# Patient Record
Sex: MALE | Race: WHITE | NOT HISPANIC OR LATINO | Employment: STUDENT | URBAN - METROPOLITAN AREA
[De-identification: names, ages, dates, MRNs, and addresses within clinical notes are randomized per-mention and may not be internally consistent; named-entity substitution may affect disease eponyms.]

---
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
## 2024-03-15 ENCOUNTER — OFFICE VISIT (OUTPATIENT)
Dept: DERMATOLOGY | Facility: CLINIC | Age: 19
End: 2024-03-15
Payer: COMMERCIAL

## 2024-03-15 DIAGNOSIS — L64.9 ANDROGENIC ALOPECIA: ICD-10-CM

## 2024-03-15 DIAGNOSIS — L70.0 ACNE VULGARIS: Primary | ICD-10-CM

## 2024-03-15 DIAGNOSIS — L21.9 SEBORRHEIC DERMATITIS: ICD-10-CM

## 2024-03-15 PROCEDURE — 99204 OFFICE O/P NEW MOD 45 MIN: CPT | Performed by: STUDENT IN AN ORGANIZED HEALTH CARE EDUCATION/TRAINING PROGRAM

## 2024-03-15 RX ORDER — MINOXIDIL 2.5 MG/1
1.25 TABLET ORAL DAILY
Qty: 45 TABLET | Refills: 3 | Status: SHIPPED | OUTPATIENT
Start: 2024-03-15 | End: 2025-03-15

## 2024-03-15 RX ORDER — BENZOYL PEROXIDE 50 MG/ML
1 LIQUID TOPICAL DAILY
Qty: 236 G | Refills: 11 | Status: SHIPPED | OUTPATIENT
Start: 2024-03-15 | End: 2025-03-15

## 2024-03-15 RX ORDER — KETOCONAZOLE 20 MG/ML
SHAMPOO, SUSPENSION TOPICAL EVERY OTHER DAY
Qty: 120 ML | Refills: 11 | Status: SHIPPED | OUTPATIENT
Start: 2024-03-15

## 2024-03-15 RX ORDER — FINASTERIDE 1 MG/1
1 TABLET, FILM COATED ORAL DAILY
Qty: 90 TABLET | Refills: 3 | Status: SHIPPED | OUTPATIENT
Start: 2024-03-15 | End: 2025-03-15

## 2024-03-15 RX ORDER — CLINDAMYCIN PHOSPHATE 10 UG/ML
LOTION TOPICAL DAILY
Qty: 60 ML | Refills: 11 | Status: SHIPPED | OUTPATIENT
Start: 2024-03-15 | End: 2025-03-15

## 2024-03-15 NOTE — PROGRESS NOTES
Subjective     Faizan Lomeli is a 18 y.o. male who presents for the following: Alopecia (Patient experiencing hair loss for the past two years. Currently oral minoxidil and finasteride. Has also tried topical minoxidil.).     Review of Systems:  No other skin or systemic complaints other than what is documented elsewhere in the note.    The following portions of the chart were reviewed this encounter and updated as appropriate:          Skin Cancer History  No skin cancer on file.      Specialty Problems    None       Objective   Well appearing patient in no apparent distress; mood and affect are within normal limits.    A focused skin examination was performed. All findings within normal limits unless otherwise noted below.    Assessment/Plan   1. Acne vulgaris  Head - Anterior (Face)  Scattered comedones and inflammatory papulopustules.    The chronic and relapsing course of acne was discussed with patient. The patient's condition is currently flaring. Discussed that acne pathogenesis is multifactorial resulting from follicular occlusion from excessive sebum production, bacterial colonization, and rupture resulting in acute and chronic inflammation.     Start BPO 5% cleanser daily to affected area. Discussed risk of skin irritation and bleaching of towels/clothing.    Start clindamycin 1% lotion qam to affected area. Discussed to use with BPO to prevent bacterial resistance.    Start adapalene 0.1% gel before bed. Start 2-3 times per week and slowly increase to nightly. Use with moisturizer. Side effects of topical retinoids reviewed including increased photosensitivity, dryness, irritation and redness.     clindamycin (Cleocin T) 1 % lotion - Head - Anterior (Face)  Apply topically once daily.    benzoyl peroxide 5 % external wash - Head - Anterior (Face)  Apply 1 Application topically once daily.    Related Procedures  Follow Up In Dermatology - Established Patient    2. Seborrheic dermatitis  Scalp  Erythema  with overlying greasy scale.    Discussed the chronic and relapsing nature of the condition.  Discussed that goal is control, not cure, of condition.     Start ketoconazole 2% shampoo daily to affected area. Leave on 5 minutes before rinsing.       ketoconazole (NIZOral) 2 % shampoo - Scalp  Apply topically every other day.    3. Androgenic alopecia  Scalp  Thinning of mid frontal and vertex scalp    Continue propecia 1 mg daily. Refilled today. Reviewed risk of sexual side effects and depression.    Discussed r/b/se of low dose oral minoxidil. This medication is taken daily and can result in decreased shedding and increased growth in some patients. Side effects include decreased BP, lightheadedness, hypertrichosis, swelling, weight gain, and edema around the heart. Discussed warning signs of swelling, chest pain, difficulty breathing. Patient to call office if they experience these symptoms.     Continue 1.25 mg minoxidil daily (1/2 2.5 mg pill)      finasteride (Propecia) 1 mg tablet - Scalp  Take 1 tablet (1 mg) by mouth once daily. Do not crush, chew, or split.    minoxidil (Loniten) 2.5 mg tablet - Scalp  Take 0.5 tablets (1.25 mg) by mouth once daily.

## 2024-09-20 ENCOUNTER — APPOINTMENT (OUTPATIENT)
Dept: DERMATOLOGY | Facility: CLINIC | Age: 19
End: 2024-09-20
Payer: COMMERCIAL

## 2024-09-20 ENCOUNTER — OFFICE VISIT (OUTPATIENT)
Dept: DERMATOLOGY | Facility: CLINIC | Age: 19
End: 2024-09-20
Payer: COMMERCIAL

## 2024-09-20 DIAGNOSIS — L21.9 SEBORRHEIC DERMATITIS: ICD-10-CM

## 2024-09-20 DIAGNOSIS — L70.0 ACNE VULGARIS: Primary | ICD-10-CM

## 2024-09-20 DIAGNOSIS — L64.9 ANDROGENIC ALOPECIA: ICD-10-CM

## 2024-09-20 PROCEDURE — 99214 OFFICE O/P EST MOD 30 MIN: CPT | Performed by: STUDENT IN AN ORGANIZED HEALTH CARE EDUCATION/TRAINING PROGRAM

## 2024-09-20 RX ORDER — CLINDAMYCIN PHOSPHATE 10 UG/ML
LOTION TOPICAL DAILY
Qty: 60 ML | Refills: 11 | Status: SHIPPED | OUTPATIENT
Start: 2024-09-20 | End: 2025-09-20

## 2024-09-20 RX ORDER — FINASTERIDE 1 MG/1
1 TABLET, FILM COATED ORAL DAILY
Qty: 90 TABLET | Refills: 3 | Status: SHIPPED | OUTPATIENT
Start: 2024-09-20 | End: 2025-09-20

## 2024-09-20 RX ORDER — KETOCONAZOLE 20 MG/ML
SHAMPOO, SUSPENSION TOPICAL EVERY OTHER DAY
Qty: 120 ML | Refills: 11 | Status: SHIPPED | OUTPATIENT
Start: 2024-09-20

## 2024-09-20 RX ORDER — BENZOYL PEROXIDE 50 MG/ML
1 LIQUID TOPICAL DAILY
Qty: 236 G | Refills: 11 | Status: SHIPPED | OUTPATIENT
Start: 2024-09-20 | End: 2025-09-20

## 2024-09-20 RX ORDER — TRETINOIN 0.25 MG/G
CREAM TOPICAL NIGHTLY
Qty: 45 G | Refills: 11 | Status: SHIPPED | OUTPATIENT
Start: 2024-09-20 | End: 2025-09-20

## 2024-09-20 RX ORDER — DOXYCYCLINE 100 MG/1
100 CAPSULE ORAL 2 TIMES DAILY
Qty: 180 CAPSULE | Refills: 0 | Status: SHIPPED | OUTPATIENT
Start: 2024-09-20

## 2024-09-20 RX ORDER — MINOXIDIL 2.5 MG/1
1.25 TABLET ORAL DAILY
Qty: 45 TABLET | Refills: 3 | Status: SHIPPED | OUTPATIENT
Start: 2024-09-20 | End: 2025-09-20

## 2024-09-20 NOTE — PROGRESS NOTES
Subjective     Faizan Lomeli is a 18 y.o. male who presents for the following: Acne (Patient is using BPO, Clindamycin, and Adapalene gel. He states that his skin had improved but since returning to school, stress has caused some flare ups. ) and Alopecia (Patient has seen a little regrowth on sides of scalp. He has been using oral minoxidil and finasteride. ).     Review of Systems:  No other skin or systemic complaints other than what is documented elsewhere in the note.    The following portions of the chart were reviewed this encounter and updated as appropriate:          Skin Cancer History  No skin cancer on file.      Specialty Problems    None       Objective   Well appearing patient in no apparent distress; mood and affect are within normal limits.    A focused skin examination was performed. All findings within normal limits unless otherwise noted below.    Assessment/Plan   1. Acne vulgaris  Head - Anterior (Face)  Scattered comedones and inflammatory papulopustules.    The chronic and relapsing course of acne was discussed with patient. The patient's condition is currently flaring. Discussed that acne pathogenesis is multifactorial resulting from follicular occlusion from excessive sebum production, bacterial colonization, and rupture resulting in acute and chronic inflammation.     Continue BPO 5% cleanser daily to affected area. Discussed risk of skin irritation and bleaching of towels/clothing.    Continue clindamycin 1% lotion qam to affected area. Discussed to use with BPO to prevent bacterial resistance.    START tretinoin 0.25%. Apply a pea sized amount every other night. Can titrate up to every night if not too dry. Rx sent. Side effects of topical retinoids reviewed including increased photosensitivity, dryness, irritation and redness.     Since pt is flaring, START doxycycline 100 mg bid x3 months. Reviewed side effects including pill esophagitis, heartburn, and photosensitivity. Advised to be  upright for 30 min after taking the pill, and take with a full glass of water. Advised to avoid dairy products    tretinoin (Retin-A) 0.025 % cream - Head - Anterior (Face)  Apply topically once daily at bedtime. Apply a pea size amount every other day. If you are not too dry for 1-2 weeks, can increase to every night    doxycycline (Monodox) 100 mg capsule - Head - Anterior (Face)  Take 1 capsule (100 mg) by mouth 2 times a day. Take with at least 8 ounces (large glass) of water, do not lie down for 30 minutes after    Related Procedures  Follow Up In Dermatology - Established Patient  Follow Up In Dermatology - Established Patient    Related Medications  clindamycin (Cleocin T) 1 % lotion  Apply topically once daily.    benzoyl peroxide 5 % external wash  Apply 1 Application topically once daily.    2. Androgenic alopecia  Scalp  Thinning of mid frontal and vertex scalp    Continue propecia 1 mg daily. Refilled today. Reviewed risk of sexual side effects and depression.    Discussed r/b/se of low dose oral minoxidil. This medication is taken daily and can result in decreased shedding and increased growth in some patients. Side effects include decreased BP, lightheadedness, hypertrichosis, swelling, weight gain, and edema around the heart. Discussed warning signs of swelling, chest pain, difficulty breathing. Patient to call office if they experience these symptoms.     Continue 1.25 mg minoxidil daily (1/2 2.5 mg pill)    Discussed do not expect to see improvement until 6 months from starting the medication      Related Medications  finasteride (Propecia) 1 mg tablet  Take 1 tablet (1 mg) by mouth once daily. Do not crush, chew, or split.    minoxidil (Loniten) 2.5 mg tablet  Take 0.5 tablets (1.25 mg) by mouth once daily.    3. Seborrheic dermatitis  Scalp  Erythema with overlying greasy scale.    Discussed the chronic and relapsing nature of the condition.  Discussed that goal is control, not cure, of condition.      Continue ketoconazole 2% shampoo daily to affected area. Leave on 5 minutes before rinsing.       Related Medications  ketoconazole (NIZOral) 2 % shampoo  Apply topically every other day.      RTC 6 months    Patient seen and discussed with Dr. Baker,   Francine Malcolm MD MPH  PGY-2, Department of Dermatology    I saw and evaluated the patient. I personally obtained the key and critical portions of the history and physical exam or was physically present for key and critical portions performed by the resident. I reviewed the resident's documentation and discussed the patient with the resident. I agree with the resident's medical decision making as documented in the note.    Ana Baker MD

## 2024-09-23 ENCOUNTER — TELEPHONE (OUTPATIENT)
Dept: DERMATOLOGY | Facility: CLINIC | Age: 19
End: 2024-09-23
Payer: COMMERCIAL

## 2024-09-23 NOTE — TELEPHONE ENCOUNTER
Left a message for patient's mother, Marilin, and informed her that Dr. Baker is out of the office this week; however, confirmed that he will absolutely be checking his messages and will be happy to send Faizan's prescriptions to the [facility] pharmacy rather than [facility]. Left our contact number should she have any further questions or concerns.

## 2024-10-01 DIAGNOSIS — L70.0 ACNE VULGARIS: ICD-10-CM

## 2024-10-01 DIAGNOSIS — L64.9 ANDROGENIC ALOPECIA: ICD-10-CM

## 2024-10-01 DIAGNOSIS — L21.9 SEBORRHEIC DERMATITIS: ICD-10-CM

## 2024-10-01 RX ORDER — TRETINOIN 0.25 MG/G
CREAM TOPICAL NIGHTLY
Qty: 45 G | Refills: 11 | Status: SHIPPED | OUTPATIENT
Start: 2024-10-01 | End: 2025-10-01

## 2024-10-01 RX ORDER — FINASTERIDE 1 MG/1
1 TABLET, FILM COATED ORAL DAILY
Qty: 90 TABLET | Refills: 3 | Status: SHIPPED | OUTPATIENT
Start: 2024-10-01 | End: 2025-10-01

## 2024-10-01 RX ORDER — KETOCONAZOLE 20 MG/ML
SHAMPOO, SUSPENSION TOPICAL EVERY OTHER DAY
Qty: 120 ML | Refills: 11 | Status: SHIPPED | OUTPATIENT
Start: 2024-10-01

## 2024-10-01 RX ORDER — BENZOYL PEROXIDE 50 MG/ML
1 LIQUID TOPICAL DAILY
Qty: 236 G | Refills: 11 | Status: SHIPPED | OUTPATIENT
Start: 2024-10-01 | End: 2025-10-01

## 2024-10-01 RX ORDER — MINOXIDIL 2.5 MG/1
1.25 TABLET ORAL DAILY
Qty: 45 TABLET | Refills: 3 | Status: SHIPPED | OUTPATIENT
Start: 2024-10-01 | End: 2025-10-01

## 2024-10-01 RX ORDER — CLINDAMYCIN PHOSPHATE 10 UG/ML
LOTION TOPICAL DAILY
Qty: 60 ML | Refills: 11 | Status: SHIPPED | OUTPATIENT
Start: 2024-10-01 | End: 2025-10-01

## 2025-03-04 ENCOUNTER — APPOINTMENT (OUTPATIENT)
Dept: DERMATOLOGY | Facility: CLINIC | Age: 20
End: 2025-03-04
Payer: COMMERCIAL

## 2025-03-04 DIAGNOSIS — L70.0 ACNE VULGARIS: ICD-10-CM

## 2025-03-04 DIAGNOSIS — L21.9 SEBORRHEIC DERMATITIS: Primary | ICD-10-CM

## 2025-03-04 DIAGNOSIS — L64.9 ANDROGENIC ALOPECIA: ICD-10-CM

## 2025-03-04 PROCEDURE — 99214 OFFICE O/P EST MOD 30 MIN: CPT | Performed by: STUDENT IN AN ORGANIZED HEALTH CARE EDUCATION/TRAINING PROGRAM

## 2025-03-04 RX ORDER — CLINDAMYCIN PHOSPHATE 10 UG/ML
LOTION TOPICAL DAILY
Qty: 60 ML | Refills: 11 | Status: SHIPPED | OUTPATIENT
Start: 2025-03-04 | End: 2026-03-04

## 2025-03-04 RX ORDER — KETOCONAZOLE 20 MG/ML
SHAMPOO, SUSPENSION TOPICAL EVERY OTHER DAY
Qty: 120 ML | Refills: 11 | Status: SHIPPED | OUTPATIENT
Start: 2025-03-04

## 2025-03-04 RX ORDER — MINOXIDIL 2.5 MG/1
2.5 TABLET ORAL DAILY
Qty: 90 TABLET | Refills: 3 | Status: SHIPPED | OUTPATIENT
Start: 2025-03-04 | End: 2026-03-04

## 2025-03-04 RX ORDER — FINASTERIDE 1 MG/1
1 TABLET, FILM COATED ORAL DAILY
Qty: 90 TABLET | Refills: 3 | Status: SHIPPED | OUTPATIENT
Start: 2025-03-04 | End: 2026-03-04

## 2025-03-04 RX ORDER — BENZOYL PEROXIDE 50 MG/ML
1 LIQUID TOPICAL DAILY
Qty: 236 G | Refills: 11 | Status: SHIPPED | OUTPATIENT
Start: 2025-03-04 | End: 2026-03-04

## 2025-03-04 RX ORDER — TRETINOIN 0.5 MG/G
CREAM TOPICAL NIGHTLY
Qty: 45 G | Refills: 11 | Status: SHIPPED | OUTPATIENT
Start: 2025-03-04 | End: 2026-03-04

## 2025-03-05 NOTE — PROGRESS NOTES
Subjective     Faizan Lomeli is a 19 y.o. male who presents for the following: Acne (6 month follow up for acne to face. Using BPO 5% wash, clindamycin 1% lotion, tretinoin 0.025%. Completed a 1 month course of doxycycline 100mg BID. States regimen is working well, had recent flare (exam week). ) and Alopecia (To scalp. Using PO minoxidil 1.25mg daily, and finasteride 1mg daily. Reports minimal change. ).     Review of Systems:  No other skin or systemic complaints other than what is documented elsewhere in the note.    The following portions of the chart were reviewed this encounter and updated as appropriate:          Skin Cancer History  No skin cancer on file.      Specialty Problems    None       Objective   Well appearing patient in no apparent distress; mood and affect are within normal limits.    A focused skin examination was performed. All findings within normal limits unless otherwise noted below.    Assessment/Plan   1. Seborrheic dermatitis  Scalp  Erythema with overlying greasy scale.    Discussed the chronic and relapsing nature of the condition.  Discussed that goal is control, not cure, of condition.     Continue ketoconazole 2% shampoo daily to affected area. Leave on 5 minutes before rinsing.       Related Medications  ketoconazole (NIZOral) 2 % shampoo  Apply topically every other day. Leave on scalp for 5 minutes before rinsing    2. Acne vulgaris  Head - Anterior (Face)  Scattered comedones and inflammatory papulopustules.    The chronic and relapsing course of acne was discussed with patient. The patient's condition is currently flaring. Discussed that acne pathogenesis is multifactorial resulting from follicular occlusion from excessive sebum production, bacterial colonization, and rupture resulting in acute and chronic inflammation.     Continue BPO 5% cleanser daily to affected area. Discussed risk of skin irritation and bleaching of towels/clothing. Start clindamycin lotion.     Continue  clindamycin 1% lotion qam to affected area. Discussed to use with BPO to prevent bacterial resistance.    Increase tretinoin 0.25%-> 0.05% cream . Apply a pea sized amount every other night. Can titrate up to every night if not too dry. Rx sent. Side effects of topical retinoids reviewed including increased photosensitivity, dryness, irritation and redness.         tretinoin (Retin-A) 0.05 % cream - Head - Anterior (Face)  Apply topically once daily at bedtime.    Related Procedures  Follow Up In Dermatology - Established Patient  Follow Up In Dermatology - Established Patient    Related Medications  benzoyl peroxide 5 % external wash  Apply 1 Application topically once daily.    clindamycin (Cleocin T) 1 % lotion  Apply topically once daily.    3. Androgenic alopecia  Scalp  Thinning of mid frontal and vertex scalp    Continue propecia 1 mg daily. Refilled today. Reviewed risk of sexual side effects and depression.    Discussed r/b/se of low dose oral minoxidil. This medication is taken daily and can result in decreased shedding and increased growth in some patients. Side effects include decreased BP, lightheadedness, hypertrichosis, swelling, weight gain, and edema around the heart. Discussed warning signs of swelling, chest pain, difficulty breathing. Patient to call office if they experience these symptoms.     Increase to 2.5 mg minoxidil daily     Discussed do not expect to see improvement until 6 months from starting the medication      Related Medications  minoxidil (Loniten) 2.5 mg tablet  Take 1 tablet (2.5 mg) by mouth once daily.    finasteride (Propecia) 1 mg tablet  Take 1 tablet (1 mg) by mouth once daily. Do not crush, chew, or split.

## 2025-05-29 ENCOUNTER — OFFICE VISIT (OUTPATIENT)
Dept: PRIMARY CARE | Facility: CLINIC | Age: 20
End: 2025-05-29
Payer: COMMERCIAL

## 2025-05-29 VITALS
SYSTOLIC BLOOD PRESSURE: 110 MMHG | DIASTOLIC BLOOD PRESSURE: 66 MMHG | OXYGEN SATURATION: 97 % | RESPIRATION RATE: 18 BRPM | TEMPERATURE: 98.5 F | HEART RATE: 85 BPM | BODY MASS INDEX: 23.22 KG/M2 | WEIGHT: 162.2 LBS | HEIGHT: 70 IN

## 2025-05-29 DIAGNOSIS — F41.9 ANXIETY: ICD-10-CM

## 2025-05-29 DIAGNOSIS — J45.20 MILD INTERMITTENT REACTIVE AIRWAY DISEASE WITHOUT COMPLICATION (HHS-HCC): Primary | ICD-10-CM

## 2025-05-29 DIAGNOSIS — J30.1 SEASONAL ALLERGIC RHINITIS DUE TO POLLEN: ICD-10-CM

## 2025-05-29 PROBLEM — S99.929A TOE INJURY: Status: ACTIVE | Noted: 2025-05-29

## 2025-05-29 PROBLEM — S93.492A SPRAIN OF ANTERIOR TALOFIBULAR LIGAMENT OF LEFT ANKLE: Status: ACTIVE | Noted: 2025-05-29

## 2025-05-29 PROBLEM — M43.00 SPONDYLOLYSIS: Status: ACTIVE | Noted: 2025-05-29

## 2025-05-29 PROBLEM — M62.830 SPASM OF LUMBAR PARASPINOUS MUSCLE: Status: ACTIVE | Noted: 2025-05-29

## 2025-05-29 PROBLEM — S76.219A GROIN STRAIN: Status: ACTIVE | Noted: 2025-05-29

## 2025-05-29 PROBLEM — R07.9 ACUTE CHEST PAIN: Status: ACTIVE | Noted: 2025-05-29

## 2025-05-29 PROBLEM — S43.202A: Status: ACTIVE | Noted: 2025-05-29

## 2025-05-29 PROBLEM — M25.551 HIP PAIN, RIGHT: Status: ACTIVE | Noted: 2025-05-29

## 2025-05-29 PROBLEM — J06.9 VIRAL UPPER RESPIRATORY TRACT INFECTION: Status: ACTIVE | Noted: 2025-05-29

## 2025-05-29 PROBLEM — S99.919A ANKLE INJURY: Status: ACTIVE | Noted: 2025-05-29

## 2025-05-29 PROBLEM — T14.8XXA: Status: ACTIVE | Noted: 2025-05-29

## 2025-05-29 PROBLEM — H52.10 AXIAL MYOPIA: Status: ACTIVE | Noted: 2025-05-29

## 2025-05-29 PROBLEM — J02.9 ACUTE PHARYNGITIS: Status: ACTIVE | Noted: 2025-05-29

## 2025-05-29 PROBLEM — H52.13 MYOPIA OF BOTH EYES: Status: ACTIVE | Noted: 2025-05-29

## 2025-05-29 PROBLEM — S42.009A CLAVICLE FRACTURE: Status: ACTIVE | Noted: 2025-05-29

## 2025-05-29 PROBLEM — M54.50 LOW BACK PAIN: Status: ACTIVE | Noted: 2025-05-29

## 2025-05-29 PROBLEM — M54.2 NECK PAIN: Status: ACTIVE | Noted: 2025-05-29

## 2025-05-29 PROBLEM — J30.9 ALLERGIC RHINITIS: Status: ACTIVE | Noted: 2025-05-29

## 2025-05-29 PROBLEM — S73.101A HIP SPRAIN, RIGHT, INITIAL ENCOUNTER: Status: ACTIVE | Noted: 2025-05-29

## 2025-05-29 PROBLEM — J45.909 REACTIVE AIRWAY DISEASE (HHS-HCC): Status: ACTIVE | Noted: 2025-05-29

## 2025-05-29 PROBLEM — S49.90XA INJURY OF CLAVICLE: Status: ACTIVE | Noted: 2025-05-29

## 2025-05-29 PROCEDURE — 99214 OFFICE O/P EST MOD 30 MIN: CPT | Performed by: INTERNAL MEDICINE

## 2025-05-29 PROCEDURE — 3008F BODY MASS INDEX DOCD: CPT | Performed by: INTERNAL MEDICINE

## 2025-05-29 RX ORDER — FLUTICASONE PROPIONATE 50 MCG
1 SPRAY, SUSPENSION (ML) NASAL DAILY
Qty: 16 G | Refills: 5 | Status: SHIPPED | OUTPATIENT
Start: 2025-05-29 | End: 2026-05-29

## 2025-05-29 ASSESSMENT — ENCOUNTER SYMPTOMS
NERVOUS/ANXIOUS: 1
COUGH: 1
WHEEZING: 1
SLEEP DISTURBANCE: 1
SHORTNESS OF BREATH: 1

## 2025-05-29 ASSESSMENT — PATIENT HEALTH QUESTIONNAIRE - PHQ9
1. LITTLE INTEREST OR PLEASURE IN DOING THINGS: NOT AT ALL
2. FEELING DOWN, DEPRESSED OR HOPELESS: NOT AT ALL
SUM OF ALL RESPONSES TO PHQ9 QUESTIONS 1 AND 2: 0

## 2025-05-29 NOTE — PROGRESS NOTES
"Michael Lomeli is a 19 y.o. male who presents for Annual Exam (CPE) and Establish Care (Establish care  - transfer from dr Maxwell ).  NP-establish care transfer  from dr Maxwell   CPE   Patient  has  allergies  getting worse , asthma-has albuterol inhaler, SOB  at times , using inhaler once a day, lately using more then usually.   Taking OTC  generic allergy medication 1 tab QD,tried  zyrtec,Claritin,allegra ,  Patient  has  a form regarding  needs emotional animal support  , to take  his cat  to  the dorm when going to school, patient states he  has  a counselor at school  when  is there  -[place]   Is born in [place], his family is  here and he is at school in [place] and feels  alone and very lonely and anxious, seeing a counselor there. His best friend  when he was 7 and his cat helped him a lot., is his emotional support.  Not on singular but takes it when he gets a cold.      Review of Systems   Respiratory:  Positive for cough, shortness of breath and wheezing.         Sometimes  with  asthma    Psychiatric/Behavioral:  Positive for sleep disturbance. The patient is nervous/anxious.    All other systems reviewed and are negative.      Objective   Physical Exam  /66 (BP Location: Left arm, Patient Position: Sitting)   Pulse 85   Temp 36.9 °C (98.5 °F)   Resp 18   Ht 1.778 m (5' 10\")   Wt 73.6 kg (162 lb 3.2 oz)   SpO2 97%   BMI 23.27 kg/m²       Assessment/Plan   Problem List Items Addressed This Visit       Allergic rhinitis    Relevant Medications    fluticasone (Flonase) 50 mcg/actuation nasal spray    Other Relevant Orders    Referral to Allergy    Reactive airway disease ([facility]) - Primary    Relevant Orders    Referral to Allergy    Anxiety    Not on medication, his cat helps him a lot         Relevant Orders    Referral to Allergy         "

## 2025-06-20 DIAGNOSIS — J30.1 SEASONAL ALLERGIC RHINITIS DUE TO POLLEN: Primary | ICD-10-CM

## 2025-06-20 RX ORDER — FLUTICASONE PROPIONATE 50 MCG
1 SPRAY, SUSPENSION (ML) NASAL DAILY
Qty: 48 ML | Refills: 2 | Status: SHIPPED | OUTPATIENT
Start: 2025-06-20

## 2025-09-08 ENCOUNTER — APPOINTMENT (OUTPATIENT)
Dept: DERMATOLOGY | Facility: CLINIC | Age: 20
End: 2025-09-08
Payer: COMMERCIAL

## 2025-09-30 ENCOUNTER — APPOINTMENT (OUTPATIENT)
Dept: ALLERGY | Facility: CLINIC | Age: 20
End: 2025-09-30
Payer: COMMERCIAL